# Patient Record
(demographics unavailable — no encounter records)

---

## 2025-01-23 NOTE — HISTORY OF PRESENT ILLNESS
[FreeTextEntry1] : referred by Dr Larry Mendelowitz, to discuss having her first colonoscopy.  there are no gi or colonic symptoms used to have periods of diarrhea or frequent diarrhea, now she has become gluten free and no symptoms since  patient is of [place] descent where valarie dx is more prevalent.  also, patient has a hx of early Hashmoto's the anti thyroid ab abated or at least improved on a gluten free diet and psoriasis improved, completely disappeared.  patient has fh of colon cancer and polyps  patients aunt had colon cancer , age sixty five, paternal and patients dad has had several polyps

## 2025-01-23 NOTE — ASSESSMENT
[FreeTextEntry1] : 1.  Patient is due for her first colon cancer screening, and obviously the history of a family history of colon polyps and cancer makes it even more important to do her first colon cancer screening now.  Will use Clenpiq 2.  The story of going off gluten, and having a complete resolution of diarrhea and even a resolution of an early Psoriatec psoriatic type rash and even improvement in her antithyroid antibodies is intriguing and at the very least now we have decided to do celiac antibodies. 3.  We realize that the optimal test might be a celiac genotype, to see if she has permissive genes for the development of celiac disease, XQ to an XQ8, but we are going to start with the serologies In the meantime of course that we will continue as always her gluten-free diet  AS WE OBTAIN INFORMED CONSENT FOR COLONOSCOPY, UPPER ENDOSCOPY [EGD], OR BOTH PROCEDURES TOGETHER;  As with all procedures, there are risks of which the patient should be aware  1.  Anesthesia; deep sedation with Propofol;  there is a small risk of aspiration and pulmonary infection.  The Anesthesiologist meets with the patient the morning of the procedure to discuss in more detail  2.  risk of bleeding; from removal of a polyp, or rarely, from biopsies, 1 % or less  3.  risk of injury or perforation of the colon or upper GI tract; one in a thousand or less,  from removing a polyp or from advancing the instrument   and we realized that she might have negative antibodies just because she is on this diet